# Patient Record
(demographics unavailable — no encounter records)

---

## 2024-11-18 NOTE — ASSESSMENT
[FreeTextEntry1] : PCOS.  (+) thyroid Ab. Discussed that treatment for PCOS is for symptoms so if symptoms are mild, then treatment is not indicated. She is willing to try both meds because she is having some facial hair.   Metformin may also help make periods to come in a monthly frequency.  Try metformin again, start with 500mg/day, and take with food; increase to BID after 2-3 weeks if not having GI side effects.  Low carb (but not keto) diet recommended.  Avoid sugared drinks. OK to try inositol supplement to make periods more regular. No specific diet is recommended for her thyroid.  Stress reduction may help in delaying or preventing overt thyroid disease. RTO 6 months

## 2024-11-18 NOTE — PHYSICAL EXAM
[Alert] : alert [No Acute Distress] : no acute distress [No Proptosis] : no proptosis [No Lid Lag] : no lid lag [Normal Hearing] : hearing was normal [No LAD] : no lymphadenopathy [Clear to Auscultation] : lungs were clear to auscultation bilaterally [Normal S1, S2] : normal S1 and S2 [Regular Rhythm] : with a regular rhythm [No Stigmata of Cushings Syndrome] : no stigmata of Cushings Syndrome [Acne] : acne present [Normal Affect] : the affect was normal [Normal Mood] : the mood was normal [Acanthosis Nigricans] : no acanthosis nigricans [de-identified] : smooth thyroid

## 2024-11-18 NOTE — DATA REVIEWED
[FreeTextEntry1] : 8/24  TSH 1.70, TPO 71.2, Tg Ab 47.5, LH 35, FSH 7.6, DHEAS 443, tot T 36.7, prolactin 18.1

## 2024-11-18 NOTE — HISTORY OF PRESENT ILLNESS
[FreeTextEntry1] : She took one dose of metformin and then felt sick, so she didn't take it again.  She also wondered if it was indicated. She also did not start spironolactone. Periods are coming every 5-6 weeks. She had one period in early October and then it came yesterday. Periods are heavy and painful in the first few days. labs results from last visit discussed:  mildly elevated DHEAS.  normal testosterone.  Normal TSH with elevated thyroid antibodies. She wants to know if there is anything she can do to avoid overt thyroid disease since it runs in her family.  PMH:  ITP PCOS